# Patient Record
Sex: MALE | Race: OTHER | HISPANIC OR LATINO
[De-identification: names, ages, dates, MRNs, and addresses within clinical notes are randomized per-mention and may not be internally consistent; named-entity substitution may affect disease eponyms.]

---

## 2024-05-13 ENCOUNTER — APPOINTMENT (OUTPATIENT)
Dept: PAIN MANAGEMENT | Facility: CLINIC | Age: 44
End: 2024-05-13
Payer: MEDICAID

## 2024-05-13 VITALS
SYSTOLIC BLOOD PRESSURE: 120 MMHG | HEART RATE: 56 BPM | DIASTOLIC BLOOD PRESSURE: 69 MMHG | HEIGHT: 68 IN | OXYGEN SATURATION: 98 % | BODY MASS INDEX: 25.46 KG/M2 | WEIGHT: 168 LBS

## 2024-05-13 DIAGNOSIS — M53.3 SACROCOCCYGEAL DISORDERS, NOT ELSEWHERE CLASSIFIED: ICD-10-CM

## 2024-05-13 DIAGNOSIS — M48.061 SPINAL STENOSIS, LUMBAR REGION WITHOUT NEUROGENIC CLAUDICATION: ICD-10-CM

## 2024-05-13 DIAGNOSIS — M21.70 UNEQUAL LIMB LENGTH (ACQUIRED), UNSPECIFIED SITE: ICD-10-CM

## 2024-05-13 PROBLEM — Z00.00 ENCOUNTER FOR PREVENTIVE HEALTH EXAMINATION: Status: ACTIVE | Noted: 2024-05-13

## 2024-05-13 PROCEDURE — 99204 OFFICE O/P NEW MOD 45 MIN: CPT

## 2024-05-13 NOTE — HISTORY OF PRESENT ILLNESS
[Back Pain] : back pain [___ mths] : [unfilled] month(s) ago [Constant] : constant [8] : a maximum pain level of 8/10 [Sharp] : sharp [Dull] : dull [Aching] : aching [Throbbing] : throbbing [Stabbing] : stabbing [Laying] : laying [Sitting] : sitting [Standing] : standing [Walking] : walking [Transitioning] : transitioning [Bending] : bending [Lifting] : lifting [Medications] : medications [Heat] : heat [Ice] : ice [Rest] : rest [FreeTextEntry1] : HPI - Mr. NICOLE CANTU is a 44-year-old male with no significant past medical history, presents with a three-month history of constant low back pain localized to the right lumbar spine, rated consistently at 8/10. The pain is described as sharp, dull, aching, throbbing, and stabbing, exacerbated by activities like laying, sitting, standing, walking, bending, and lifting. Relief is reported with medications, heat, ice, and rest. He previously experienced a similar episode, effectively managed with two medications, but did not follow up with spine or pain management.   Reports there is NO present numbness, there is NO weakness. Patient denies any bowel/bladder incontinence, no saddle/perineal anesthesia or any other red flag signs or symptoms. Reports regular BMs.    [FreeTextEntry7] : lumbar spine right side. [FreeTextEntry4] : patient has had imaging here in the emergency room. has not done any physical therapy.

## 2024-05-13 NOTE — PHYSICAL EXAM
[de-identified] : General Appearance: Level of consciousness: Alert Body habitus: Well-nourished Signs of distress: Some noticeable discomfort. Hygiene: Clean   Psychiatric: Appropriate mood and affect. Cooperative.   Skin: Nailbeds pink with no cyanosis or clubbing. Lesions or rashes: None observed   Head and Neck: The head is normocephalic and atraumatic Eyes: Conjunctivae  clear without exudates. Sclera is non-icteric Ears: No discharge. Hearing is intact with good acuity Nose: No discharge. No nasal flaring Mouth and throat: Trachea Midline, teeth and gums are without obvious lesion   Respiratory: Breathing pattern: Normal Chest movement: Symmetrical Use of accessory muscles: Absent Cough: Absent Wheezing: Absent   Cardiovascular System: Pulse: Regular Cyanosis: Absent   Abdomen: Inspection: No distention Visible pulsations: Absent   Musculoskeletal System: Muscle strength:   strength is normal bilaterally. Gait: Steady, NO assistive device Posture: Upright Rises from a seated position with SOME difficulty due to pain     Spine: No gross deformity or signs of trauma. Curvature of the cervical, thoracic, and lumbar spine are within normal limits. Spinous processes are midline. NO tenderness of spinous processes. Paraspinal musculature is NOT hypertonic NO discomfort is noted with flexion MILD discomfort is noted with extension No discomfort is noted with side-to-side rotation   Straight leg raise test is negative bilaterally.  SACROILIAC DYSFUNCTION TESTS:  Ronni's: Positive Gaenslen's Negative: Thigh thrust:  Positive ASIS distraction: Positive Sacral compression:Positive Sacroiliac tenderness to palpation :  Positive Hamilton Finger:Positive    Neurological System: Cranial nerves 2-12: Grossly intact Motor function: Moving all extremities against gravity Dorsi/plantar flexion is normal bilaterally. Sensation: No gross deficit to light touch

## 2024-05-13 NOTE — ASSESSMENT
[FreeTextEntry1] : Mr. NICOLE CANTU is a 44 year M suffering from low back and left buttock pain, that based upon today's subjective complaints, physical examination, and CT imaging  review, is likely secondary to LEFT sacroiliac joint dysfunction  >> Medications  Chronic opioid use for non-malignant pain, in particular at high doses would not be recommended since it can potentially lead to hyperalgesia (hypersensitivity), tolerance and addiction.       >> Interventions   Arrange for LEFT  sacroiliac joint injection under fluoroscopic guidance. Success rate and possible complications discussed with patient in detail.   >> Therapy and Other Modalities   Continue with daily home stretching regimen  >> Imaging and Other Studies  None indicated  >> Consults  None ordered

## 2024-05-30 ENCOUNTER — APPOINTMENT (OUTPATIENT)
Dept: PODIATRY | Facility: CLINIC | Age: 44
End: 2024-05-30

## 2024-06-03 ENCOUNTER — RX RENEWAL (OUTPATIENT)
Age: 44
End: 2024-06-03

## 2024-06-03 ENCOUNTER — APPOINTMENT (OUTPATIENT)
Dept: PAIN MANAGEMENT | Facility: CLINIC | Age: 44
End: 2024-06-03

## 2024-06-03 RX ORDER — MELOXICAM 15 MG/1
15 TABLET ORAL
Qty: 25 | Refills: 0 | Status: ACTIVE | COMMUNITY
Start: 2024-05-13 | End: 1900-01-01